# Patient Record
Sex: MALE | Race: WHITE | NOT HISPANIC OR LATINO | Employment: UNEMPLOYED | URBAN - METROPOLITAN AREA
[De-identification: names, ages, dates, MRNs, and addresses within clinical notes are randomized per-mention and may not be internally consistent; named-entity substitution may affect disease eponyms.]

---

## 2020-09-11 ENCOUNTER — NURSE TRIAGE (OUTPATIENT)
Dept: NURSING | Facility: CLINIC | Age: 20
End: 2020-09-11

## 2020-09-12 NOTE — TELEPHONE ENCOUNTER
Pt is calling.    He was seen today for testicular pain. Urine and blood tests were done. Everything looked good on the blood work and the urine, but she recommended taking Doxycycline. He took one pill, and then got a message stating that the sedimentation rate was normal stating that there was no acute infection.   He is awaiting results for gonorrhea and chlamydia.   He is wondering if he stops the Doxycycline, and then may need to restart it, will that affect anything. I advised him that if she prescribed it to be taken, and he was told to start it now, then he should continue with it. We are still waiting on the culture results. If she wanted him to take it now, then continue with it until results come back and he is told to stop it.  All questions answered.   I advised him to call back with any further questions or concerns. He verbalized understanding.    Additional Information    Negative: Lab result questions    Negative: [1] Caller is not with the child AND [2] is reporting urgent symptoms    Negative: Medication or pharmacy questions    Negative: Caller is rude or angry    Negative: Caller cannot be reached by phone    Negative: Caller has already spoken to PCP or another triager    Negative: RN needs further essential information from caller in order to complete triage    Negative: Requesting regular office appointment    Negative: Requesting referral to a specialist    Negative: [1] Caller requesting nonurgent health information AND [2] PCP's office is the best resource    Health Information question, no triage required and triager able to answer question    Protocols used: INFORMATION ONLY CALL - NO TRIAGE-P-    Waleska Glass RN  [facility] Triage Nurse Advisor  9/11/2020 at 8:11 PM

## 2024-10-27 ENCOUNTER — HOSPITAL ENCOUNTER (EMERGENCY)
Facility: CLINIC | Age: 24
Discharge: HOME | End: 2024-10-27
Attending: FAMILY MEDICINE | Admitting: FAMILY MEDICINE
Payer: COMMERCIAL

## 2024-10-27 ENCOUNTER — APPOINTMENT (OUTPATIENT)
Dept: GENERAL RADIOLOGY | Facility: CLINIC | Age: 24
End: 2024-10-27
Attending: FAMILY MEDICINE
Payer: COMMERCIAL

## 2024-10-27 VITALS
HEIGHT: 77 IN | TEMPERATURE: 97.5 F | HEART RATE: 91 BPM | SYSTOLIC BLOOD PRESSURE: 134 MMHG | WEIGHT: 186.3 LBS | DIASTOLIC BLOOD PRESSURE: 83 MMHG | OXYGEN SATURATION: 98 % | BODY MASS INDEX: 22 KG/M2 | RESPIRATION RATE: 16 BRPM

## 2024-10-27 DIAGNOSIS — I44.4 LEFT ANTERIOR FASCICULAR BLOCK: ICD-10-CM

## 2024-10-27 DIAGNOSIS — R07.89 ATYPICAL CHEST PAIN: ICD-10-CM

## 2024-10-27 LAB
ALBUMIN SERPL BCG-MCNC: 4.5 G/DL (ref 3.5–5.2)
ALP SERPL-CCNC: 62 U/L (ref 40–150)
ALT SERPL W P-5'-P-CCNC: 28 U/L (ref 0–70)
ANION GAP SERPL CALCULATED.3IONS-SCNC: 9 MMOL/L (ref 7–15)
AST SERPL W P-5'-P-CCNC: 19 U/L (ref 0–45)
ATRIAL RATE - MUSE: 86 BPM
BASOPHILS # BLD AUTO: 0.1 10E3/UL (ref 0–0.2)
BASOPHILS NFR BLD AUTO: 1 %
BILIRUB SERPL-MCNC: 0.4 MG/DL
BUN SERPL-MCNC: 13.5 MG/DL (ref 6–20)
CALCIUM SERPL-MCNC: 9.3 MG/DL (ref 8.8–10.4)
CHLORIDE SERPL-SCNC: 101 MMOL/L (ref 98–107)
CREAT SERPL-MCNC: 1.05 MG/DL (ref 0.67–1.17)
D DIMER PPP FEU-MCNC: <0.27 UG/ML FEU (ref 0–0.5)
DIASTOLIC BLOOD PRESSURE - MUSE: NORMAL MMHG
EGFRCR SERPLBLD CKD-EPI 2021: >90 ML/MIN/1.73M2
EOSINOPHIL # BLD AUTO: 0.1 10E3/UL (ref 0–0.7)
EOSINOPHIL NFR BLD AUTO: 2 %
ERYTHROCYTE [DISTWIDTH] IN BLOOD BY AUTOMATED COUNT: 11.8 % (ref 10–15)
GLUCOSE SERPL-MCNC: 88 MG/DL (ref 70–99)
HCO3 SERPL-SCNC: 29 MMOL/L (ref 22–29)
HCT VFR BLD AUTO: 42.1 % (ref 40–53)
HGB BLD-MCNC: 15.3 G/DL (ref 13.3–17.7)
IMM GRANULOCYTES # BLD: 0 10E3/UL
IMM GRANULOCYTES NFR BLD: 0 %
INTERPRETATION ECG - MUSE: NORMAL
LYMPHOCYTES # BLD AUTO: 1.9 10E3/UL (ref 0.8–5.3)
LYMPHOCYTES NFR BLD AUTO: 33 %
MAGNESIUM SERPL-MCNC: 2 MG/DL (ref 1.7–2.3)
MCH RBC QN AUTO: 32.2 PG (ref 26.5–33)
MCHC RBC AUTO-ENTMCNC: 36.3 G/DL (ref 31.5–36.5)
MCV RBC AUTO: 89 FL (ref 78–100)
MONOCYTES # BLD AUTO: 0.5 10E3/UL (ref 0–1.3)
MONOCYTES NFR BLD AUTO: 9 %
NEUTROPHILS # BLD AUTO: 3.2 10E3/UL (ref 1.6–8.3)
NEUTROPHILS NFR BLD AUTO: 55 %
NRBC # BLD AUTO: 0 10E3/UL
NRBC BLD AUTO-RTO: 0 /100
P AXIS - MUSE: 76 DEGREES
PLATELET # BLD AUTO: 157 10E3/UL (ref 150–450)
POTASSIUM SERPL-SCNC: 4.2 MMOL/L (ref 3.4–5.3)
PR INTERVAL - MUSE: 152 MS
PROT SERPL-MCNC: 7.1 G/DL (ref 6.4–8.3)
QRS DURATION - MUSE: 122 MS
QT - MUSE: 356 MS
QTC - MUSE: 426 MS
R AXIS - MUSE: -43 DEGREES
RBC # BLD AUTO: 4.75 10E6/UL (ref 4.4–5.9)
SODIUM SERPL-SCNC: 139 MMOL/L (ref 135–145)
SYSTOLIC BLOOD PRESSURE - MUSE: NORMAL MMHG
T AXIS - MUSE: 76 DEGREES
TROPONIN T SERPL HS-MCNC: <6 NG/L
VENTRICULAR RATE- MUSE: 86 BPM
WBC # BLD AUTO: 5.8 10E3/UL (ref 4–11)

## 2024-10-27 PROCEDURE — 84484 ASSAY OF TROPONIN QUANT: CPT | Performed by: FAMILY MEDICINE

## 2024-10-27 PROCEDURE — 71046 X-RAY EXAM CHEST 2 VIEWS: CPT

## 2024-10-27 PROCEDURE — 99284 EMERGENCY DEPT VISIT MOD MDM: CPT | Performed by: FAMILY MEDICINE

## 2024-10-27 PROCEDURE — 93005 ELECTROCARDIOGRAM TRACING: CPT | Performed by: FAMILY MEDICINE

## 2024-10-27 PROCEDURE — 85025 COMPLETE CBC W/AUTO DIFF WBC: CPT | Performed by: FAMILY MEDICINE

## 2024-10-27 PROCEDURE — 99285 EMERGENCY DEPT VISIT HI MDM: CPT | Mod: 25 | Performed by: FAMILY MEDICINE

## 2024-10-27 PROCEDURE — 80053 COMPREHEN METABOLIC PANEL: CPT | Performed by: FAMILY MEDICINE

## 2024-10-27 PROCEDURE — 93010 ELECTROCARDIOGRAM REPORT: CPT | Performed by: FAMILY MEDICINE

## 2024-10-27 PROCEDURE — 85379 FIBRIN DEGRADATION QUANT: CPT | Performed by: FAMILY MEDICINE

## 2024-10-27 PROCEDURE — 83735 ASSAY OF MAGNESIUM: CPT | Performed by: FAMILY MEDICINE

## 2024-10-27 PROCEDURE — 36415 COLL VENOUS BLD VENIPUNCTURE: CPT | Performed by: FAMILY MEDICINE

## 2024-10-27 ASSESSMENT — COLUMBIA-SUICIDE SEVERITY RATING SCALE - C-SSRS
2. HAVE YOU ACTUALLY HAD ANY THOUGHTS OF KILLING YOURSELF IN THE PAST MONTH?: NO
6. HAVE YOU EVER DONE ANYTHING, STARTED TO DO ANYTHING, OR PREPARED TO DO ANYTHING TO END YOUR LIFE?: NO
1. IN THE PAST MONTH, HAVE YOU WISHED YOU WERE DEAD OR WISHED YOU COULD GO TO SLEEP AND NOT WAKE UP?: NO

## 2024-10-27 ASSESSMENT — ACTIVITIES OF DAILY LIVING (ADL)
ADLS_ACUITY_SCORE: 0
ADLS_ACUITY_SCORE: 0

## 2024-10-27 NOTE — DISCHARGE INSTRUCTIONS
Please make an appointment to follow up with Cardiology Clinic (phone: [phone]), a referral was placed in the emergency department to discuss what if any further workup or longitudinal follow-up is necessary.  Resume your normal activities.  May use Tylenol if you develop recurrent symptoms, if your symptoms are severe and unremitting seek further evaluation.

## 2024-10-27 NOTE — ED PROVIDER NOTES
[facility] EMERGENCY DEPARTMENT ([facility])    10/27/24      ED PROVIDER NOTE   History     Chief Complaint   Patient presents with    Chest Pain     Chest pain, feared he may have had an MI last evening, left arm pain, lost orientation,      HPI  Ha Amaya is a 23 year old male who presents to the emergency department for chest pain.  He states he has a history of panic attacks and anxiety, was not certain if he was having a panic attack.  Around 9 AM he started feeling anxious, tight in his chest, feeling a pinch in his left shoulder.  He states he started sweating and felt like he might faint, he did not faint.  He was able to sit down, regulate his breathing and the symptoms seem to improve.  When he went to bed the pain was almost gone.  Today around 11 AM he was talking with some friends about cardiac issues when he started to experience the same symptoms.  The symptoms are still persistent but have improved.  He states he had a rather extensive cardiac workup in March 2023 at the [facility] due to some issues that his father had which was essentially negative.  Is being treated for generalized anxiety disorder.  Does not use significant amounts of drugs or alcohol.  No recent fever, chills, cough or runny nose.  Has not noted any leg pains or swelling.  There is no personal or family history of DVT or PE.    Past Medical History  History reviewed. No pertinent past medical history.  History reviewed. No pertinent surgical history.  No current outpatient medications on file.    No Known Allergies  Family History  History reviewed. No pertinent family history.  Social History   Social History     Tobacco Use    Smoking status: Some Days     Types: Cigarettes    Smokeless tobacco: Never   Substance Use Topics    Alcohol use: Yes     Comment: occasional    Drug use: Not Currently      A medically appropriate review of systems was performed with pertinent positives and negatives noted in the  "HPI, and all other systems negative.    Physical Exam   BP: 134/83  Pulse: 95  Temp: 97.5  F (36.4  C)  Resp: 16  Height: 195.6 cm (6' 5\")  Weight: 84.5 kg (186 lb 4.8 oz)  SpO2: 98 %  Physical Exam  Vitals and nursing note reviewed.   Constitutional:       General: He is not in acute distress.     Appearance: Normal appearance. He is not toxic-appearing.   HENT:      Head: Atraumatic.   Eyes:      General: No scleral icterus.     Conjunctiva/sclera: Conjunctivae normal.   Cardiovascular:      Rate and Rhythm: Normal rate and regular rhythm.      Pulses: Normal pulses.      Heart sounds: Normal heart sounds. No murmur heard.  Pulmonary:      Effort: Pulmonary effort is normal. No respiratory distress.      Breath sounds: Normal breath sounds.   Chest:      Chest wall: Tenderness (Patient is tender over the left clavicle and the left trapezius.) present.   Abdominal:      Palpations: Abdomen is soft.      Tenderness: There is no abdominal tenderness.   Musculoskeletal:         General: No deformity.      Cervical back: Neck supple.      Right lower leg: No edema.      Left lower leg: No edema.   Skin:     General: Skin is warm.   Neurological:      Mental Status: He is alert.           ED Course, Procedures, & Data      Procedures            EKG Interpretation:      Interpreted by Jeronimo Dougherty MD  Time reviewed: 1526  Symptoms at time of EKG: Chest pain  Rhythm: normal sinus   Rate: Normal  Axis: Left Axis Deviation  Ectopy: none  Conduction: nonspecific interventricular conduction block and left anterior fasciclar block  ST Segments/ T Waves: Non-specific ST-T wave changes  Q Waves: nonspecific  Comparison to prior: No old EKG available    Clinical Impression: Normal sinus rhythm with left anterior fascicular block and nonspecific ST-T changes, no old EKG for comparison                  Results for orders placed or performed during the hospital encounter of 10/27/24   XR Chest 2 Views     Status: None    Narrative "    EXAM: XR CHEST 2 VIEWS  LOCATION: [facility]  DATE: 10/27/2024    INDICATION: cp  COMPARISON: None.      Impression    IMPRESSION: Negative chest.   Troponin T, High Sensitivity     Status: Normal   Result Value Ref Range    Troponin T, High Sensitivity <6 <=22 ng/L   Magnesium     Status: Normal   Result Value Ref Range    Magnesium 2.0 1.7 - 2.3 mg/dL   Comprehensive metabolic panel     Status: Normal   Result Value Ref Range    Sodium 139 135 - 145 mmol/L    Potassium 4.2 3.4 - 5.3 mmol/L    Carbon Dioxide (CO2) 29 22 - 29 mmol/L    Anion Gap 9 7 - 15 mmol/L    Urea Nitrogen 13.5 6.0 - 20.0 mg/dL    Creatinine 1.05 0.67 - 1.17 mg/dL    GFR Estimate >90 >60 mL/min/1.73m2    Calcium 9.3 8.8 - 10.4 mg/dL    Chloride 101 98 - 107 mmol/L    Glucose 88 70 - 99 mg/dL    Alkaline Phosphatase 62 40 - 150 U/L    AST 19 0 - 45 U/L    ALT 28 0 - 70 U/L    Protein Total 7.1 6.4 - 8.3 g/dL    Albumin 4.5 3.5 - 5.2 g/dL    Bilirubin Total 0.4 <=1.2 mg/dL   D dimer quantitative     Status: Normal   Result Value Ref Range    D-Dimer Quantitative <0.27 0.00 - 0.50 ug/mL FEU    Narrative    This D-dimer assay is intended for use in conjunction with a clinical pretest probability assessment model to exclude pulmonary embolism (PE) and deep venous thrombosis (DVT) in outpatients suspected of PE or DVT. The cut-off value is 0.50 ug/mL FEU.   CBC with platelets and differential     Status: None   Result Value Ref Range    WBC Count 5.8 4.0 - 11.0 10e3/uL    RBC Count 4.75 4.40 - 5.90 10e6/uL    Hemoglobin 15.3 13.3 - 17.7 g/dL    Hematocrit 42.1 40.0 - 53.0 %    MCV 89 78 - 100 fL    MCH 32.2 26.5 - 33.0 pg    MCHC 36.3 31.5 - 36.5 g/dL    RDW 11.8 10.0 - 15.0 %    Platelet Count 157 150 - 450 10e3/uL    % Neutrophils 55 %    % Lymphocytes 33 %    % Monocytes 9 %    % Eosinophils 2 %    % Basophils 1 %    % Immature Granulocytes 0 %    NRBCs per 100 WBC 0 <1 /100    Absolute Neutrophils  3.2 1.6 - 8.3 10e3/uL    Absolute Lymphocytes 1.9 0.8 - 5.3 10e3/uL    Absolute Monocytes 0.5 0.0 - 1.3 10e3/uL    Absolute Eosinophils 0.1 0.0 - 0.7 10e3/uL    Absolute Basophils 0.1 0.0 - 0.2 10e3/uL    Absolute Immature Granulocytes 0.0 <=0.4 10e3/uL    Absolute NRBCs 0.0 10e3/uL   EKG 12-lead, tracing only     Status: None (Preliminary result)   Result Value Ref Range    Systolic Blood Pressure  mmHg    Diastolic Blood Pressure  mmHg    Ventricular Rate 86 BPM    Atrial Rate 86 BPM    CT Interval 152 ms    QRS Duration 122 ms     ms    QTc 426 ms    P Axis 76 degrees    R AXIS -43 degrees    T Axis 76 degrees    Interpretation ECG Sinus rhythm  Left axis deviation  Abnormal ECG      CBC with platelets differential     Status: None    Narrative    The following orders were created for panel order CBC with platelets differential.  Procedure                               Abnormality         Status                     ---------                               -----------         ------                     CBC with platelets and d...[[id number]]                      Final result                 Please view results for these tests on the individual orders.     Medications - No data to display  Labs Ordered and Resulted from Time of ED Arrival to Time of ED Departure   TROPONIN T, HIGH SENSITIVITY - Normal       Result Value    Troponin T, High Sensitivity <6     MAGNESIUM - Normal    Magnesium 2.0     COMPREHENSIVE METABOLIC PANEL - Normal    Sodium 139      Potassium 4.2      Carbon Dioxide (CO2) 29      Anion Gap 9      Urea Nitrogen 13.5      Creatinine 1.05      GFR Estimate >90      Calcium 9.3      Chloride 101      Glucose 88      Alkaline Phosphatase 62      AST 19      ALT 28      Protein Total 7.1      Albumin 4.5      Bilirubin Total 0.4     D DIMER QUANTITATIVE - Normal    D-Dimer Quantitative <0.27     CBC WITH PLATELETS AND DIFFERENTIAL    WBC Count 5.8      RBC Count 4.75      Hemoglobin 15.3       Hematocrit 42.1      MCV 89      MCH 32.2      MCHC 36.3      RDW 11.8      Platelet Count 157      % Neutrophils 55      % Lymphocytes 33      % Monocytes 9      % Eosinophils 2      % Basophils 1      % Immature Granulocytes 0      NRBCs per 100 WBC 0      Absolute Neutrophils 3.2      Absolute Lymphocytes 1.9      Absolute Monocytes 0.5      Absolute Eosinophils 0.1      Absolute Basophils 0.1      Absolute Immature Granulocytes 0.0      Absolute NRBCs 0.0       XR Chest 2 Views   Final Result   IMPRESSION: Negative chest.             Critical care was not performed.     Medical Decision Making  The patient's presentation was of moderate complexity (an acute illness with systemic symptoms).    The patient's evaluation involved:  review of 2 test result(s) ordered prior to this encounter (prior echo and cardiac CT image from [place] March 2023)  ordering and/or review of 3+ test(s) in this encounter (see separate area of note for details)  independent interpretation of testing performed by another health professional (chest x-ray images personally reviewed reviewed radiologist interpretation)    The patient's management necessitated only low risk treatment.    Assessment & Plan    23-year-old male with a history of generalized anxiety, presenting with 12 hours of atypical chest pain, palpitations, left shoulder pain.  Differential diagnosis does include life-threatening causes of chest pain such as ACS, arrhythmia, dissection, pneumonia, PE, pneumothorax, mediastinitis, pericarditis, myocarditis as well as more benign causes including costochondritis, intercostal neuritis, anxiety.  Exam he has normal vital signs.  His blood pressure and pulse are symmetric in both upper extremities.  He is in no distress.  He does have mild left upper chest wall and left trapezius tenderness.  The neurovascular examination of his left upper extremity is otherwise normal.  He has symmetric pulses in all extremities, normal  cardiovascular exam, normal physical exam.  EKG significant for a left anterior fascicular block, no old EKG for comparison.  He has had prior cardiac studies at the [facility] in March 2023 when his father apparently presented with valvular heart disease, and he states he was subsequently worked up.  The studies include an echo and a cardiac CT which were negative.  He did not have an EKG that day for comparison to today.  The patient's troponin is less than 6, this despite symptoms of over 12 hours duration making ACS highly unlikely as the etiology of his symptoms.  His chest x-ray is normal he has no secondary risk factors for dissection and no physical exam findings to support that diagnosis I do not think further workup for this is indicated.  He is low probability for PE by Wells criteria and his D-dimer is negative, I do not think further workup for PE is indicated.  Overall no findings to support a life-threatening etiology of his chest pain.  Given his father's history and previously undiagnosed left anterior fascicular block in the setting of new atypical chest pain, he has questions regarding any need for follow-up or prognosis will refer to cardiology follow-up with close indications for emergency department return.  This is likely musculoskeletal chest pain.  Plan to treat symptomatically as well.  We discussed the indications for emergency department return and follow-up.  Stable for discharge.    I have reviewed the nursing notes. I have reviewed the findings, diagnosis, plan and need for follow up with the patient.    New Prescriptions    No medications on file       Final diagnoses:   Atypical chest pain   Left anterior fascicular block       Jeronimo Dougherty MD    [facility] EMERGENCY DEPARTMENT  10/27/2024     Jeronimo Dougherty MD  10/27/24 1166

## 2024-10-27 NOTE — ED NOTES
Bed: ED04  Expected date: 10/27/24  Expected time: 3:13 PM  Means of arrival:   Comments:  Ha Urias

## 2024-12-01 ENCOUNTER — HEALTH MAINTENANCE LETTER (OUTPATIENT)
Age: 24
End: 2024-12-01

## 2024-12-11 ENCOUNTER — OFFICE VISIT (OUTPATIENT)
Dept: CARDIOLOGY | Facility: CLINIC | Age: 24
End: 2024-12-11
Attending: FAMILY MEDICINE
Payer: COMMERCIAL

## 2024-12-11 VITALS
DIASTOLIC BLOOD PRESSURE: 84 MMHG | SYSTOLIC BLOOD PRESSURE: 134 MMHG | WEIGHT: 196.89 LBS | OXYGEN SATURATION: 98 % | BODY MASS INDEX: 23.35 KG/M2 | HEART RATE: 83 BPM

## 2024-12-11 DIAGNOSIS — I44.4 LEFT ANTERIOR FASCICULAR BLOCK: ICD-10-CM

## 2024-12-11 DIAGNOSIS — R07.89 ATYPICAL CHEST PAIN: ICD-10-CM

## 2024-12-11 ASSESSMENT — PATIENT HEALTH QUESTIONNAIRE - PHQ9: SUM OF ALL RESPONSES TO PHQ QUESTIONS 1-9: 6

## 2024-12-11 NOTE — NURSING NOTE
Depression Response    Patient completed the PHQ-9 assessment for depression and scored >9? Yes  Question 9 on the PHQ-9 was positive for suicidality? No  Does patient have current mental health provider? Unknown by writer    Is this a virtual visit? No    I personally notified the following: clinic nurse and visit provider    REYMUNDO Mabry

## 2024-12-11 NOTE — PATIENT INSTRUCTIONS
Thank you for coming to the [facility] at [place]; please note the following instructions:    1. Schedule for exercise stress test  2: Follow-up as needed        If you have any questions regarding your visit, please contact your care team:     CARDIOLOGY  TELEPHONE NUMBER   Kathie YAYonatan, Registered Nurse  Sydnee AGUIRRE, Registered Nurse  Torri WHITE, Registered Medical Assistant  Kamryn RODRIGUEZ, Certified Medical Assistant  Mara MORA, Clinic Assistant [phone] (select option 1)    *After hours: [phone]   For Scheduling Appts:     [phone] (select option 1)    *After hours: [phone]   For the Device Clinic (Pacemakers and ICD's)  Waleska BOLTON, Registered Nurse   During business hours: [phone]    *After business hours:  [phone] (select option 4)      Normal test result notifications will be released via [employer] or mailed within 7 business days.  All other test results, will be communicated via telephone once reviewed by your cardiologist.    If you need a medication refill, please contact your pharmacy.  Please allow 3 business days for your refill to be completed.    As always, thank you for trusting us with your health care needs!

## 2024-12-11 NOTE — LETTER
12/11/2024      RE: Ha Amaya  [address]  [facility] [address]       Dear Colleague,    Thank you for the opportunity to participate in the care of your patient, Ha Amaya, at the [facility]. Please see a copy of my visit note below.    I am delighted to see Ha Amaya in consultation.Diagnoses of Atypical chest pain and Left anterior fascicular block were pertinent to this visit.   As you know, the patient is a 24 year old  male. He   has no past medical history on file..    On this visit, the patient states that he was recently seen in the ED for an episode of chest pain.  The workup was negative for MI.  He has had no more incidences. The patient denies dyspnea, palpitations, near-syncope, syncope, orthopnea, paroxysmal nocturnal dyspnea, and lower extermity edema.    The patient's cardiovascular risk factors include none.    The following portions of the patient's history were reviewed and updated as appropriate: allergies, current medications, past family history, past medical history, past social history, past surgical history, and the problem list.    PMH: The patient's past medical history includes:    History reviewed. No pertinent past medical history.   History reviewed. No pertinent surgical history.    The patient's medications as of the current encounter are:     Current Outpatient Medications   Medication Sig Dispense Refill     NATTOKINASE PO Take by mouth.         Labs:     Admission on 10/27/2024, Discharged on 10/27/2024   Component Date Value Ref Range Status     Ventricular Rate 10/27/2024 86  BPM Final     Atrial Rate 10/27/2024 86  BPM Final     MT Interval 10/27/2024 152  ms Final     QRS Duration 10/27/2024 122  ms Final     QT 10/27/2024 356  ms Final     QTc 10/27/2024 426  ms Final     P Axis 10/27/2024 76  degrees Final     R AXIS 10/27/2024 -43   degrees Final     T Axis 10/27/2024 76  degrees Final     Interpretation ECG 10/27/2024    Final                    Value:Sinus rhythm  Left axis deviation  Abnormal ECG  Unconfirmed report - interpretation of this ECG is computer generated - see medical record for final interpretation  Confirmed by - EMERGENCY ROOM, PHYSICIAN ([id number]),  GUICHO SAM ([id number]) on 10/27/2024 9:11:28 PM       Troponin T, High Sensitivity 10/27/2024 <6  <=22 ng/L Final    Either a High Sensitivity Troponin T baseline (0 hours) value = 100 ng/L, or an increase in High Sensitivity Troponin T = 7 ng/L at 2 hours compared to 0 hours (2-0 hours), suggests myocardial injury, and urgent clinical attention is required.    If the 2-0 hours increase is <7 ng/L, a High Sensitivity Troponin T result above gender-specific reference ranges warrants further evaluation.   Recommendations for further evaluation include correlation with clinical decision-making tool (e.g., HEART), a 3rd High Sensitivity Troponin T test 2 hours after the 2nd (a 20% change from baseline would represent concern), admission for observation, close PCC/cardiology follow-up, or urgent outpatient provocative testing.     Magnesium 10/27/2024 2.0  1.7 - 2.3 mg/dL Final     Sodium 10/27/2024 139  135 - 145 mmol/L Final     Potassium 10/27/2024 4.2  3.4 - 5.3 mmol/L Final     Carbon Dioxide (CO2) 10/27/2024 29  22 - 29 mmol/L Final     Anion Gap 10/27/2024 9  7 - 15 mmol/L Final     Urea Nitrogen 10/27/2024 13.5  6.0 - 20.0 mg/dL Final     Creatinine 10/27/2024 1.05  0.67 - 1.17 mg/dL Final     GFR Estimate 10/27/2024 >90  >60 mL/min/1.73m2 Final    eGFR calculated using 2021 CKD-EPI equation.     Calcium 10/27/2024 9.3  8.8 - 10.4 mg/dL Final    Reference intervals for this test were updated on 7/16/2024 to reflect our healthy population more accurately. There may be differences in the flagging of prior results with similar values performed with this method. Those prior  results can be interpreted in the context of the updated reference intervals.     Chloride 10/27/2024 101  98 - 107 mmol/L Final     Glucose 10/27/2024 88  70 - 99 mg/dL Final     Alkaline Phosphatase 10/27/2024 62  40 - 150 U/L Final     AST 10/27/2024 19  0 - 45 U/L Final     ALT 10/27/2024 28  0 - 70 U/L Final     Protein Total 10/27/2024 7.1  6.4 - 8.3 g/dL Final     Albumin 10/27/2024 4.5  3.5 - 5.2 g/dL Final     Bilirubin Total 10/27/2024 0.4  <=1.2 mg/dL Final     D-Dimer Quantitative 10/27/2024 <0.27  0.00 - 0.50 ug/mL FEU Final     WBC Count 10/27/2024 5.8  4.0 - 11.0 10e3/uL Final     RBC Count 10/27/2024 4.75  4.40 - 5.90 10e6/uL Final     Hemoglobin 10/27/2024 15.3  13.3 - 17.7 g/dL Final     Hematocrit 10/27/2024 42.1  40.0 - 53.0 % Final     MCV 10/27/2024 89  78 - 100 fL Final     MCH 10/27/2024 32.2  26.5 - 33.0 pg Final     MCHC 10/27/2024 36.3  31.5 - 36.5 g/dL Final     RDW 10/27/2024 11.8  10.0 - 15.0 % Final     Platelet Count 10/27/2024 157  150 - 450 10e3/uL Final     % Neutrophils 10/27/2024 55  % Final     % Lymphocytes 10/27/2024 33  % Final     % Monocytes 10/27/2024 9  % Final     % Eosinophils 10/27/2024 2  % Final     % Basophils 10/27/2024 1  % Final     % Immature Granulocytes 10/27/2024 0  % Final     NRBCs per 100 WBC 10/27/2024 0  <1 /100 Final     Absolute Neutrophils 10/27/2024 3.2  1.6 - 8.3 10e3/uL Final     Absolute Lymphocytes 10/27/2024 1.9  0.8 - 5.3 10e3/uL Final     Absolute Monocytes 10/27/2024 0.5  0.0 - 1.3 10e3/uL Final     Absolute Eosinophils 10/27/2024 0.1  0.0 - 0.7 10e3/uL Final     Absolute Basophils 10/27/2024 0.1  0.0 - 0.2 10e3/uL Final     Absolute Immature Granulocytes 10/27/2024 0.0  <=0.4 10e3/uL Final     Absolute NRBCs 10/27/2024 0.0  10e3/uL Final       Allergies:    Allergies   Allergen Reactions     Sunscreens Rash       Family History:   Family History   Problem Relation Age of Onset     No Known Problems Mother      Other Problems  (bicuspid  aortic valve) Father      No Known Problems Sister        Psychosocial history:  reports that he has been smoking cigarettes. He has never used smokeless tobacco. He reports current alcohol use. He reports that he does not currently use drugs.    Review of systems: negative, palpitations, exertional chest pain or pressure, paroxysmal nocturnal dyspnea, dyspnea on exertion, orthopnea, lower extremity edema, syncope or near-syncope, and exercise intolerance    In addition,   General: No change in weight, sleep or appetite.  Normal energy.  No fever or chills  Eyes: Negative for vision changes or eye problems  ENT: No problems with ears, nose or throat.  No difficulty swallowing.  Resp: No coughing, wheezing or shortness of breath  GI: No nausea, vomiting,  heartburn, abdominal pain, diarrhea, constipation or change in bowel habits  : No urinary frequency or dysuria, bladder or kidney problems  Musculoskeletal: No significant muscle or joint pains  Neurologic: No headaches, numbness, tingling, weakness, problems with balance or coordination  Psychiatric: stable depression  Heme/immune/allergy: No history of bleeding or clotting problems or anemia.    Endocrine: No history of thyroid disease, diabetes or other endocrine disorders  Skin: No rashes,worrisome lesions or skin problems  Vascular:  No claudication, lifestyle limiting or otherwise; no ischemic rest pain; no non-healing ulcers. No weakness, No loss of sensation        Physical examination  Vitals: /84 (BP Location: Right arm, Patient Position: Sitting, Cuff Size: Adult Regular)   Pulse 83   Wt 89.3 kg (196 lb 14.2 oz)   SpO2 98%   BMI 23.35 kg/m    BMI= Body mass index is 23.35 kg/m .    In general, the patient is a pleasant male in no apparent distress.    HEENT: Normiocephalic and atraumatic.  PERRLA.  EOMI.  Sclerae white, not injected.    Neck: No adenopathy.  No thyromegaly. Carotids +2/2 bilaterally without bruits.  No jugular venous  distension.   Heart:  The PMI is in the 5th ICS in the midclavicular line. There is no heave. Regular rate and rhythm. Normal S1, S2 splits physiologically. No murmur, rub, click, or gallop.    Lungs: Clear to asculation.  No ronchi, wheezes, rales.  No dullness to percussion.   Abdomen: Soft, nontender, nondistended. No organomegaly. No AAA.  No bruits.   Extremities: No clubbing, cyanosis, or edema. The pulses were intact bilaterally.   Neurological: The neurological examination reveal a patient who was oriented to person, place, and time.  The remainder of the examination was nonfocal.    Cardiac tests include:    10/27/24 - ecg - NSR, LAFB  3/3/23 - echo - normal EF, normal aortic vavle    Assessment and Plan    Chest pain - check stress test  2. Depression - patient states he has no suicidal ideation    The patient is to return  prn. The patient understood the treatment plan as outlined above.  There were no barriers to learning.      Amandeep Reddy MD     Please do not hesitate to contact me if you have any questions/concerns.     Sincerely,     Amandeep Reddy MD

## 2024-12-11 NOTE — PROGRESS NOTES
I am delighted to see Ha J Fuelneliatrice in consultation.Diagnoses of Atypical chest pain and Left anterior fascicular block were pertinent to this visit.   As you know, the patient is a 24 year old  male. He   has no past medical history on file..    On this visit, the patient states that he was recently seen in the ED for an episode of chest pain.  The workup was negative for MI.  He has had no more incidences. The patient denies dyspnea, palpitations, near-syncope, syncope, orthopnea, paroxysmal nocturnal dyspnea, and lower extermity edema.    The patient's cardiovascular risk factors include none.    The following portions of the patient's history were reviewed and updated as appropriate: allergies, current medications, past family history, past medical history, past social history, past surgical history, and the problem list.    PMH: The patient's past medical history includes:    History reviewed. No pertinent past medical history.   History reviewed. No pertinent surgical history.    The patient's medications as of the current encounter are:     Current Outpatient Medications   Medication Sig Dispense Refill    NATTOKINASE PO Take by mouth.         Labs:     Admission on 10/27/2024, Discharged on 10/27/2024   Component Date Value Ref Range Status    Ventricular Rate 10/27/2024 86  BPM Final    Atrial Rate 10/27/2024 86  BPM Final    ME Interval 10/27/2024 152  ms Final    QRS Duration 10/27/2024 122  ms Final    QT 10/27/2024 356  ms Final    QTc 10/27/2024 426  ms Final    P Axis 10/27/2024 76  degrees Final    R AXIS 10/27/2024 -43  degrees Final    T Axis 10/27/2024 76  degrees Final    Interpretation ECG 10/27/2024    Final                    Value:Sinus rhythm  Left axis deviation  Abnormal ECG  Unconfirmed report - interpretation of this ECG is computer generated - see medical record for final interpretation  Confirmed by - EMERGENCY ROOM, PHYSICIAN ([id number]),  GUICHO SAM ([id number]) on  10/27/2024 9:11:28 PM      Troponin T, High Sensitivity 10/27/2024 <6  <=22 ng/L Final    Either a High Sensitivity Troponin T baseline (0 hours) value = 100 ng/L, or an increase in High Sensitivity Troponin T = 7 ng/L at 2 hours compared to 0 hours (2-0 hours), suggests myocardial injury, and urgent clinical attention is required.    If the 2-0 hours increase is <7 ng/L, a High Sensitivity Troponin T result above gender-specific reference ranges warrants further evaluation.   Recommendations for further evaluation include correlation with clinical decision-making tool (e.g., HEART), a 3rd High Sensitivity Troponin T test 2 hours after the 2nd (a 20% change from baseline would represent concern), admission for observation, close PCC/cardiology follow-up, or urgent outpatient provocative testing.    Magnesium 10/27/2024 2.0  1.7 - 2.3 mg/dL Final    Sodium 10/27/2024 139  135 - 145 mmol/L Final    Potassium 10/27/2024 4.2  3.4 - 5.3 mmol/L Final    Carbon Dioxide (CO2) 10/27/2024 29  22 - 29 mmol/L Final    Anion Gap 10/27/2024 9  7 - 15 mmol/L Final    Urea Nitrogen 10/27/2024 13.5  6.0 - 20.0 mg/dL Final    Creatinine 10/27/2024 1.05  0.67 - 1.17 mg/dL Final    GFR Estimate 10/27/2024 >90  >60 mL/min/1.73m2 Final    eGFR calculated using 2021 CKD-EPI equation.    Calcium 10/27/2024 9.3  8.8 - 10.4 mg/dL Final    Reference intervals for this test were updated on 7/16/2024 to reflect our healthy population more accurately. There may be differences in the flagging of prior results with similar values performed with this method. Those prior results can be interpreted in the context of the updated reference intervals.    Chloride 10/27/2024 101  98 - 107 mmol/L Final    Glucose 10/27/2024 88  70 - 99 mg/dL Final    Alkaline Phosphatase 10/27/2024 62  40 - 150 U/L Final    AST 10/27/2024 19  0 - 45 U/L Final    ALT 10/27/2024 28  0 - 70 U/L Final    Protein Total 10/27/2024 7.1  6.4 - 8.3 g/dL Final    Albumin 10/27/2024  4.5  3.5 - 5.2 g/dL Final    Bilirubin Total 10/27/2024 0.4  <=1.2 mg/dL Final    D-Dimer Quantitative 10/27/2024 <0.27  0.00 - 0.50 ug/mL FEU Final    WBC Count 10/27/2024 5.8  4.0 - 11.0 10e3/uL Final    RBC Count 10/27/2024 4.75  4.40 - 5.90 10e6/uL Final    Hemoglobin 10/27/2024 15.3  13.3 - 17.7 g/dL Final    Hematocrit 10/27/2024 42.1  40.0 - 53.0 % Final    MCV 10/27/2024 89  78 - 100 fL Final    MCH 10/27/2024 32.2  26.5 - 33.0 pg Final    MCHC 10/27/2024 36.3  31.5 - 36.5 g/dL Final    RDW 10/27/2024 11.8  10.0 - 15.0 % Final    Platelet Count 10/27/2024 157  150 - 450 10e3/uL Final    % Neutrophils 10/27/2024 55  % Final    % Lymphocytes 10/27/2024 33  % Final    % Monocytes 10/27/2024 9  % Final    % Eosinophils 10/27/2024 2  % Final    % Basophils 10/27/2024 1  % Final    % Immature Granulocytes 10/27/2024 0  % Final    NRBCs per 100 WBC 10/27/2024 0  <1 /100 Final    Absolute Neutrophils 10/27/2024 3.2  1.6 - 8.3 10e3/uL Final    Absolute Lymphocytes 10/27/2024 1.9  0.8 - 5.3 10e3/uL Final    Absolute Monocytes 10/27/2024 0.5  0.0 - 1.3 10e3/uL Final    Absolute Eosinophils 10/27/2024 0.1  0.0 - 0.7 10e3/uL Final    Absolute Basophils 10/27/2024 0.1  0.0 - 0.2 10e3/uL Final    Absolute Immature Granulocytes 10/27/2024 0.0  <=0.4 10e3/uL Final    Absolute NRBCs 10/27/2024 0.0  10e3/uL Final       Allergies:    Allergies   Allergen Reactions    Sunscreens Rash       Family History:   Family History   Problem Relation Age of Onset    No Known Problems Mother     Other Problems  (bicuspid aortic valve) Father     No Known Problems Sister        Psychosocial history:  reports that he has been smoking cigarettes. He has never used smokeless tobacco. He reports current alcohol use. He reports that he does not currently use drugs.    Review of systems: negative, palpitations, exertional chest pain or pressure, paroxysmal nocturnal dyspnea, dyspnea on exertion, orthopnea, lower extremity edema, syncope or  near-syncope, and exercise intolerance    In addition,   General: No change in weight, sleep or appetite.  Normal energy.  No fever or chills  Eyes: Negative for vision changes or eye problems  ENT: No problems with ears, nose or throat.  No difficulty swallowing.  Resp: No coughing, wheezing or shortness of breath  GI: No nausea, vomiting,  heartburn, abdominal pain, diarrhea, constipation or change in bowel habits  : No urinary frequency or dysuria, bladder or kidney problems  Musculoskeletal: No significant muscle or joint pains  Neurologic: No headaches, numbness, tingling, weakness, problems with balance or coordination  Psychiatric: stable depression  Heme/immune/allergy: No history of bleeding or clotting problems or anemia.    Endocrine: No history of thyroid disease, diabetes or other endocrine disorders  Skin: No rashes,worrisome lesions or skin problems  Vascular:  No claudication, lifestyle limiting or otherwise; no ischemic rest pain; no non-healing ulcers. No weakness, No loss of sensation        Physical examination  Vitals: /84 (BP Location: Right arm, Patient Position: Sitting, Cuff Size: Adult Regular)   Pulse 83   Wt 89.3 kg (196 lb 14.2 oz)   SpO2 98%   BMI 23.35 kg/m    BMI= Body mass index is 23.35 kg/m .    In general, the patient is a pleasant male in no apparent distress.    HEENT: Normiocephalic and atraumatic.  PERRLA.  EOMI.  Sclerae white, not injected.    Neck: No adenopathy.  No thyromegaly. Carotids +2/2 bilaterally without bruits.  No jugular venous distension.   Heart:  The PMI is in the 5th ICS in the midclavicular line. There is no heave. Regular rate and rhythm. Normal S1, S2 splits physiologically. No murmur, rub, click, or gallop.    Lungs: Clear to asculation.  No ronchi, wheezes, rales.  No dullness to percussion.   Abdomen: Soft, nontender, nondistended. No organomegaly. No AAA.  No bruits.   Extremities: No clubbing, cyanosis, or edema. The pulses were intact  bilaterally.   Neurological: The neurological examination reveal a patient who was oriented to person, place, and time.  The remainder of the examination was nonfocal.    Cardiac tests include:    10/27/24 - ecg - NSR, LAFB  3/3/23 - echo - normal EF, normal aortic vavle    Assessment and Plan    Chest pain - check stress test  2. Depression - patient states he has no suicidal ideation    The patient is to return  prn. The patient understood the treatment plan as outlined above.  There were no barriers to learning.      Amandeep Reddy MD

## 2024-12-11 NOTE — NURSING NOTE
"Patient educated to the following during visit and educated to contact RN or MD for any questions.     Plan reviewed with patient:     Exercise stress test- scheduled  PRN follow-up       Reviewed Med list and verified all medications with patient.   Completed AVS  Follow-up orders placed  Marked chart \"Ready for Checkout\"  Update sent to JESSE Kerr to follow-up on results with MD    Patient verbalized understanding of plan and follow-up and agreed to call with further questions or concerns.       Toya Fairchild RN    [facility] :  PHQ-9 Screening Note  SITUATION/BACKGROUND                                                    Ha Amaya is a 24 year old male who completed the PHQ-9 assessment for depression and Score is >9.    Onset of symptoms: waxing and waning  Trigger: NA     Recent related events: None  Prior history of suicide attempt or self harm: No  Risk Factors: anxiety  History of depression or mental illness: Yes  Medications reviewed: Yes     ASSESSMENT      Are any of the following present?    Suicidal thoughts with a plan and means to carry out the plan?  Intent to harm others  Altered mental status: confusion, delusional, psychotic NO - go to B   Are any of the following present?    Suicidal thoughts without a plan or means to carry out the plan  New onset of delusional ideas  Past inpatient admission for depression  New onset and recent change or addition of new medication NO - go to C   Are any of the following present?    Previous suicide attempts  Depression interfering with ability to work or function  Loss of appetite and eating poorly  Abrupt cessation of drugs (OTC or RX), alcohol or caffeine  Drug or alcohol abuse NO - go to D   Are several of the following present?    Difficulty concentrating  Difficulty sleeping  Reduced interest in sexual activity or impotency  Irregular or absent menstruation  No interest in activity  Change in interpersonal " relationships  Increased use/abuse of alcohol or drugs  Pregnant or recent child birth  Recent major life change  History of depression NO - provide home care instructions         PLAN      Home Care Instructions:   Call local crisis interventions    Report the following to your PCP:   Suicidal thoughts without a plan or means to carry out the plan  Depression interferes with daily activities  Persistent inability to sleep    Seek emergency care immediately if any of the following occur:   Suicidal thoughts and plan and means to carry out the plan  Injury to self or others  Altered mental status:  Confusion, Delusional, Pyschotic    BEHAVIORAL HEALTH TEAMS      [facility] [place]  [place] [facility] Pager: [phone]    [place]  - Behavioral Health Team  Pager number: [phone]    Referral to Behavioral Health    BEHAVIORAL / SPIRITUAL HEALTH SOWQ [00060}    RESOURCES      - 24/7 Crisis Hotlines: National Suicide Prevention Hotline  645-789-XCXC (8612)    Toya Fairchild RN        Copyright 2016 [employer]

## 2024-12-11 NOTE — NURSING NOTE
"Chief Complaint   Patient presents with    New Patient     Reason for visit: New general cardiology for atypical chest pain, left anterior fascicular block       Initial /84 (BP Location: Right arm, Patient Position: Sitting, Cuff Size: Adult Regular)   Pulse 83   Wt 89.3 kg (196 lb 14.2 oz)   SpO2 98%   BMI 23.35 kg/m   Estimated body mass index is 23.35 kg/m  as calculated from the following:    Height as of 10/27/24: 1.956 m (6' 5\").    Weight as of this encounter: 89.3 kg (196 lb 14.2 oz)..  BP completed using cuff size: regular    REYMUNDO Mabry      "